# Patient Record
Sex: FEMALE | Race: BLACK OR AFRICAN AMERICAN | NOT HISPANIC OR LATINO | Employment: OTHER | URBAN - METROPOLITAN AREA
[De-identification: names, ages, dates, MRNs, and addresses within clinical notes are randomized per-mention and may not be internally consistent; named-entity substitution may affect disease eponyms.]

---

## 2020-02-21 LAB
INFLUENZA A ANTIGEN, POC: NEGATIVE
INFLUENZA B ANTIGEN, POC: NEGATIVE
RAPID GROUP A STREP (OHS): NEGATIVE

## 2022-01-04 LAB
INFLUENZA A ANTIGEN, POC: NEGATIVE
INFLUENZA B ANTIGEN, POC: NEGATIVE
RAPID GROUP A STREP (OHS): POSITIVE
SARS-COV-2 RNA RESP QL NAA+PROBE: NEGATIVE

## 2022-04-11 ENCOUNTER — HISTORICAL (OUTPATIENT)
Dept: ADMINISTRATIVE | Facility: HOSPITAL | Age: 57
End: 2022-04-11
Payer: OTHER GOVERNMENT

## 2022-04-26 VITALS
OXYGEN SATURATION: 97 % | HEIGHT: 66 IN | BODY MASS INDEX: 27.63 KG/M2 | SYSTOLIC BLOOD PRESSURE: 150 MMHG | WEIGHT: 171.94 LBS | DIASTOLIC BLOOD PRESSURE: 79 MMHG

## 2022-07-22 ENCOUNTER — OFFICE VISIT (OUTPATIENT)
Dept: RHEUMATOLOGY | Facility: CLINIC | Age: 57
End: 2022-07-22
Payer: OTHER GOVERNMENT

## 2022-07-22 VITALS
SYSTOLIC BLOOD PRESSURE: 135 MMHG | BODY MASS INDEX: 27.16 KG/M2 | RESPIRATION RATE: 18 BRPM | HEIGHT: 66 IN | DIASTOLIC BLOOD PRESSURE: 86 MMHG | WEIGHT: 169 LBS | OXYGEN SATURATION: 99 %

## 2022-07-22 DIAGNOSIS — L40.9 PSORIASIS: ICD-10-CM

## 2022-07-22 DIAGNOSIS — F31.9 BIPOLAR AFFECTIVE DISORDER, REMISSION STATUS UNSPECIFIED: ICD-10-CM

## 2022-07-22 DIAGNOSIS — E55.9 VITAMIN D DEFICIENCY: ICD-10-CM

## 2022-07-22 DIAGNOSIS — F90.0 ATTENTION DEFICIT HYPERACTIVITY DISORDER, PREDOMINANTLY INATTENTIVE TYPE: ICD-10-CM

## 2022-07-22 DIAGNOSIS — L40.50 PSORIATIC ARTHRITIS: Primary | ICD-10-CM

## 2022-07-22 DIAGNOSIS — F51.01 PRIMARY INSOMNIA: ICD-10-CM

## 2022-07-22 DIAGNOSIS — F43.12 CHRONIC POST-TRAUMATIC STRESS DISORDER: ICD-10-CM

## 2022-07-22 DIAGNOSIS — M79.7 FIBROMYALGIA: ICD-10-CM

## 2022-07-22 PROBLEM — G47.00 INSOMNIA: Status: ACTIVE | Noted: 2022-07-22

## 2022-07-22 PROBLEM — I10 ESSENTIAL HYPERTENSION: Status: ACTIVE | Noted: 2020-12-09

## 2022-07-22 PROBLEM — I67.1 CEREBRAL ANEURYSM, NONRUPTURED: Status: ACTIVE | Noted: 2020-12-09

## 2022-07-22 PROCEDURE — 99204 PR OFFICE/OUTPT VISIT, NEW, LEVL IV, 45-59 MIN: ICD-10-PCS | Mod: S$PBB,ICN,, | Performed by: INTERNAL MEDICINE

## 2022-07-22 PROCEDURE — 99999 PR PBB SHADOW E&M-EST. PATIENT-LVL IV: CPT | Mod: PBBFAC,,, | Performed by: INTERNAL MEDICINE

## 2022-07-22 PROCEDURE — 99999 PR PBB SHADOW E&M-EST. PATIENT-LVL IV: ICD-10-PCS | Mod: PBBFAC,,, | Performed by: INTERNAL MEDICINE

## 2022-07-22 PROCEDURE — 99214 OFFICE O/P EST MOD 30 MIN: CPT | Mod: PBBFAC | Performed by: INTERNAL MEDICINE

## 2022-07-22 PROCEDURE — 99204 OFFICE O/P NEW MOD 45 MIN: CPT | Mod: S$PBB,ICN,, | Performed by: INTERNAL MEDICINE

## 2022-07-22 RX ORDER — LANOLIN ALCOHOL/MO/W.PET/CERES
1 CREAM (GRAM) TOPICAL DAILY
COMMUNITY
Start: 2022-01-11 | End: 2023-03-14 | Stop reason: SDUPTHER

## 2022-07-22 RX ORDER — FOLIC ACID 1 MG/1
1 TABLET ORAL DAILY
COMMUNITY
Start: 2022-01-11 | End: 2022-11-03 | Stop reason: SDUPTHER

## 2022-07-22 RX ORDER — DEXTROAMPHETAMINE SACCHARATE, AMPHETAMINE ASPARTATE MONOHYDRATE, DEXTROAMPHETAMINE SULFATE AND AMPHETAMINE SULFATE 5; 5; 5; 5 MG/1; MG/1; MG/1; MG/1
20 CAPSULE, EXTENDED RELEASE ORAL DAILY
COMMUNITY

## 2022-07-22 RX ORDER — METHOTREXATE 2.5 MG/1
10 TABLET ORAL
Qty: 20 TABLET | Refills: 5 | Status: SHIPPED | OUTPATIENT
Start: 2022-07-22 | End: 2022-11-03 | Stop reason: SDUPTHER

## 2022-07-22 RX ORDER — TRAZODONE HYDROCHLORIDE 100 MG/1
1 TABLET ORAL NIGHTLY
COMMUNITY
Start: 2022-06-16

## 2022-07-22 RX ORDER — HYDROXYZINE HYDROCHLORIDE 25 MG/1
25 TABLET, FILM COATED ORAL
COMMUNITY
Start: 2022-03-30

## 2022-07-22 RX ORDER — CYCLOBENZAPRINE HCL 10 MG
10 TABLET ORAL
COMMUNITY
Start: 2022-01-11 | End: 2022-07-22

## 2022-07-22 RX ORDER — CHOLECALCIFEROL (VITAMIN D3) 50 MCG
TABLET ORAL DAILY
COMMUNITY
Start: 2022-01-11

## 2022-07-22 RX ORDER — TIZANIDINE 4 MG/1
4 TABLET ORAL NIGHTLY
Qty: 30 TABLET | Refills: 5 | Status: SHIPPED | OUTPATIENT
Start: 2022-07-22 | End: 2022-11-03 | Stop reason: SDUPTHER

## 2022-07-22 RX ORDER — VENLAFAXINE HYDROCHLORIDE 75 MG/1
75 CAPSULE, EXTENDED RELEASE ORAL DAILY
COMMUNITY
Start: 2021-08-16

## 2022-07-22 RX ORDER — ASPIRIN 81 MG/1
81 TABLET ORAL DAILY
COMMUNITY

## 2022-07-22 NOTE — PROGRESS NOTES
"Subjective:       Patient ID: Dona Barton is a 56 y.o. female.    Chief Complaint: New patient  (New patient-generalized pain )    Pt  is complaining of joint pain involving her spine, MCP PIP wrist elbow shoulders hips knees sacroiliacs and ankles bilaterally.  Is 8/10 in intensity dull in quality and continuous.  It is associated with a morning stiffness lasting for more than 60 minutes. Pt reports having difficulty maintaining a good night of sleep and this has been associated with myalgia of 7/10 in intensity.  This pain is dull continuous and gets worse mainly at night.  It is associated with fatigue. psoriasis    Review of Systems   Constitutional: Negative for appetite change, chills and fever.   HENT: Negative for congestion, ear pain, mouth sores, nosebleeds and trouble swallowing.    Eyes: Negative for photophobia and discharge.   Respiratory: Negative for chest tightness and shortness of breath.    Cardiovascular: Negative for chest pain.   Gastrointestinal: Negative for abdominal pain and vomiting.   Endocrine: Negative.    Genitourinary: Negative for hematuria.   Musculoskeletal:        As per HPI   Skin: Negative for rash.        psoriasis   Neurological: Negative for weakness.         Objective:   /86 (BP Location: Left arm, Patient Position: Sitting, BP Method: Medium (Automatic))   Resp 18   Ht 5' 6" (1.676 m)   Wt 76.7 kg (169 lb)   SpO2 99%   BMI 27.28 kg/m²      Physical Exam   Constitutional: She is oriented to person, place, and time. She appears well-developed and well-nourished. No distress.   HENT:   Head: Normocephalic and atraumatic.   Right Ear: External ear normal.   Left Ear: External ear normal.   Eyes: Pupils are equal, round, and reactive to light.   Cardiovascular: Normal rate, regular rhythm and normal heart sounds.   Pulmonary/Chest: Breath sounds normal.   Abdominal: Soft. There is no abdominal tenderness.   Musculoskeletal:      Right shoulder: Tenderness present. "      Left shoulder: Tenderness present.      Right elbow: Tenderness present.      Left elbow: Tenderness present.      Right wrist: Tenderness present.      Left wrist: Tenderness present.      Cervical back: Neck supple.      Right hip: Tenderness present.      Left hip: Tenderness present.      Right knee: Tenderness present.      Left knee: Tenderness present.      Right ankle: Tenderness present.      Left ankle: Tenderness present.   Lymphadenopathy:     She has no cervical adenopathy.   Neurological: She is alert and oriented to person, place, and time. She displays normal reflexes. No cranial nerve deficit or sensory deficit. She exhibits normal muscle tone. Coordination normal.   Skin: No rash noted. No erythema.   psoriasis   Vitals reviewed.      Right Side Rheumatological Exam     The patient is tender to palpation of the shoulder, elbow, wrist, knee, 1st PIP, 1st MCP, 2nd PIP, 2nd MCP, 3rd PIP, 3rd MCP, 4th PIP, 4th MCP, 5th PIP, hip, ankle, 1st MTP, 2nd MTP, 3rd MTP, 4th MTP, 5th MTP, 1st toe IP, 2nd toe IP, 3rd toe IP, 4th toe IP and 5th toe IP    Left Side Rheumatological Exam     The patient is tender to palpation of the shoulder, elbow, wrist, knee, 1st PIP, 1st MCP, 2nd PIP, 2nd MCP, 3rd PIP, 3rd MCP, 4th PIP, 4th MCP, 5th PIP, 5th MCP, hip, ankle, 1st MTP, 2nd MTP, 3rd MTP, 4th MTP, 5th MTP, 1st toe IP, 2nd toe IP, 3rd toe IP, 4th toe IP and 5th toe IP.         Completed Fibromyalgia exam 18/18 tender points.  No data to display     Assessment:       1. Psoriatic arthritis    2. Psoriasis    3. Fibromyalgia    4. Vitamin D deficiency    5. Bipolar affective disorder, remission status unspecified    6. Chronic post-traumatic stress disorder    7. Attention deficit hyperactivity disorder, predominantly inattentive type    8. Primary insomnia            Plan:       Problem List Items Addressed This Visit        Psychiatric    Attention deficit hyperactivity disorder, predominantly inattentive type     Relevant Orders    CBC Auto Differential    Comprehensive Metabolic Panel    CRP, High Sensitivity    Vitamin D    Rheumatoid Quantitative    Cyclic Citrullinated Peptide Antibody, IgG    Antinuclear Ab, HEp-2 Substrate    Hepatitis B Surface Antigen    Hepatitis C Antibody    TSH    T4, Free    X-Ray Sacroiliac Joints Complete    X-Ray Pelvis Routine AP    Chronic post-traumatic stress disorder    Relevant Orders    CBC Auto Differential    Comprehensive Metabolic Panel    CRP, High Sensitivity    Vitamin D    Rheumatoid Quantitative    Cyclic Citrullinated Peptide Antibody, IgG    Antinuclear Ab, HEp-2 Substrate    Hepatitis B Surface Antigen    Hepatitis C Antibody    TSH    T4, Free    X-Ray Sacroiliac Joints Complete    X-Ray Pelvis Routine AP    Bipolar disorder    Relevant Orders    CBC Auto Differential    Comprehensive Metabolic Panel    CRP, High Sensitivity    Vitamin D    Rheumatoid Quantitative    Cyclic Citrullinated Peptide Antibody, IgG    Antinuclear Ab, HEp-2 Substrate    Hepatitis B Surface Antigen    Hepatitis C Antibody    TSH    T4, Free    X-Ray Sacroiliac Joints Complete    X-Ray Pelvis Routine AP       Derm    Psoriasis    Relevant Orders    CBC Auto Differential    Comprehensive Metabolic Panel    CRP, High Sensitivity    Vitamin D    Rheumatoid Quantitative    Cyclic Citrullinated Peptide Antibody, IgG    Antinuclear Ab, HEp-2 Substrate    Hepatitis B Surface Antigen    Hepatitis C Antibody    TSH    T4, Free    X-Ray Sacroiliac Joints Complete    X-Ray Pelvis Routine AP       Endocrine    Vitamin D deficiency    Relevant Orders    CBC Auto Differential    Comprehensive Metabolic Panel    CRP, High Sensitivity    Vitamin D    Rheumatoid Quantitative    Cyclic Citrullinated Peptide Antibody, IgG    Antinuclear Ab, HEp-2 Substrate    Hepatitis B Surface Antigen    Hepatitis C Antibody    TSH    T4, Free    X-Ray Sacroiliac Joints Complete    X-Ray Pelvis Routine AP       Orthopedic     Fibromyalgia    Relevant Orders    CBC Auto Differential    Comprehensive Metabolic Panel    CRP, High Sensitivity    Vitamin D    Rheumatoid Quantitative    Cyclic Citrullinated Peptide Antibody, IgG    Antinuclear Ab, HEp-2 Substrate    Hepatitis B Surface Antigen    Hepatitis C Antibody    TSH    T4, Free    X-Ray Sacroiliac Joints Complete    X-Ray Pelvis Routine AP    Psoriatic arthritis - Primary    Relevant Orders    CBC Auto Differential    Comprehensive Metabolic Panel    CRP, High Sensitivity    Vitamin D    Rheumatoid Quantitative    Cyclic Citrullinated Peptide Antibody, IgG    Antinuclear Ab, HEp-2 Substrate    Hepatitis B Surface Antigen    Hepatitis C Antibody    TSH    T4, Free    X-Ray Sacroiliac Joints Complete    X-Ray Pelvis Routine AP       Other    Insomnia    Relevant Orders    CBC Auto Differential    Comprehensive Metabolic Panel    CRP, High Sensitivity    Vitamin D    Rheumatoid Quantitative    Cyclic Citrullinated Peptide Antibody, IgG    Antinuclear Ab, HEp-2 Substrate    Hepatitis B Surface Antigen    Hepatitis C Antibody    TSH    T4, Free    X-Ray Sacroiliac Joints Complete    X-Ray Pelvis Routine AP

## 2022-08-24 ENCOUNTER — LAB REQUISITION (OUTPATIENT)
Dept: LAB | Facility: HOSPITAL | Age: 57
End: 2022-08-24

## 2022-08-24 DIAGNOSIS — L73.8 OTHER SPECIFIED FOLLICULAR DISORDERS: ICD-10-CM

## 2022-08-24 PROCEDURE — 87070 CULTURE OTHR SPECIMN AEROBIC: CPT | Performed by: PEDIATRICS

## 2022-08-27 LAB — BACTERIA SPEC CULT: NO GROWTH

## 2022-09-22 ENCOUNTER — HISTORICAL (OUTPATIENT)
Dept: ADMINISTRATIVE | Facility: HOSPITAL | Age: 57
End: 2022-09-22
Payer: OTHER GOVERNMENT

## 2022-10-04 ENCOUNTER — TELEPHONE (OUTPATIENT)
Dept: RHEUMATOLOGY | Facility: CLINIC | Age: 57
End: 2022-10-04
Payer: OTHER GOVERNMENT

## 2022-10-04 DIAGNOSIS — L40.50 PSORIATIC ARTHRITIS: Primary | ICD-10-CM

## 2022-10-04 RX ORDER — OMEPRAZOLE 40 MG/1
40 CAPSULE, DELAYED RELEASE ORAL DAILY
Qty: 30 CAPSULE | Refills: 11 | Status: SHIPPED | OUTPATIENT
Start: 2022-10-04 | End: 2022-11-03 | Stop reason: SDUPTHER

## 2022-10-04 RX ORDER — DICLOFENAC SODIUM 50 MG/1
50 TABLET, DELAYED RELEASE ORAL 2 TIMES DAILY PRN
Qty: 60 TABLET | Refills: 5 | Status: SHIPPED | OUTPATIENT
Start: 2022-10-04 | End: 2023-03-09 | Stop reason: SDUPTHER

## 2022-10-04 RX ORDER — GABAPENTIN 100 MG/1
100 CAPSULE ORAL 2 TIMES DAILY
Qty: 60 CAPSULE | Refills: 5 | Status: SHIPPED | OUTPATIENT
Start: 2022-10-04 | End: 2022-11-03 | Stop reason: SDUPTHER

## 2022-10-04 NOTE — TELEPHONE ENCOUNTER
Patient recently diagnosed with Lupus. Notes are in chart. Patient would like to know if you recommend any treatment at this time. She is an established pt and has an appt in November. Please advise.

## 2022-11-03 ENCOUNTER — OFFICE VISIT (OUTPATIENT)
Dept: RHEUMATOLOGY | Facility: CLINIC | Age: 57
End: 2022-11-03
Payer: OTHER GOVERNMENT

## 2022-11-03 VITALS
HEIGHT: 66 IN | SYSTOLIC BLOOD PRESSURE: 108 MMHG | WEIGHT: 166.81 LBS | BODY MASS INDEX: 26.81 KG/M2 | OXYGEN SATURATION: 99 % | DIASTOLIC BLOOD PRESSURE: 70 MMHG | RESPIRATION RATE: 16 BRPM | TEMPERATURE: 98 F | HEART RATE: 69 BPM

## 2022-11-03 DIAGNOSIS — F51.01 PRIMARY INSOMNIA: ICD-10-CM

## 2022-11-03 DIAGNOSIS — L40.50 PSORIATIC ARTHRITIS: Primary | ICD-10-CM

## 2022-11-03 DIAGNOSIS — F31.9 BIPOLAR AFFECTIVE DISORDER, REMISSION STATUS UNSPECIFIED: ICD-10-CM

## 2022-11-03 DIAGNOSIS — E55.9 VITAMIN D DEFICIENCY: ICD-10-CM

## 2022-11-03 DIAGNOSIS — M79.7 FIBROMYALGIA: ICD-10-CM

## 2022-11-03 DIAGNOSIS — L40.9 PSORIASIS: ICD-10-CM

## 2022-11-03 DIAGNOSIS — F43.12 CHRONIC POST-TRAUMATIC STRESS DISORDER: ICD-10-CM

## 2022-11-03 DIAGNOSIS — F90.0 ATTENTION DEFICIT HYPERACTIVITY DISORDER, PREDOMINANTLY INATTENTIVE TYPE: ICD-10-CM

## 2022-11-03 DIAGNOSIS — K90.0 CELIAC DISEASE: ICD-10-CM

## 2022-11-03 PROCEDURE — 99214 PR OFFICE/OUTPT VISIT, EST, LEVL IV, 30-39 MIN: ICD-10-PCS | Mod: S$PBB,,, | Performed by: INTERNAL MEDICINE

## 2022-11-03 PROCEDURE — 99999 PR PBB SHADOW E&M-EST. PATIENT-LVL IV: ICD-10-PCS | Mod: PBBFAC,,, | Performed by: INTERNAL MEDICINE

## 2022-11-03 PROCEDURE — 99214 OFFICE O/P EST MOD 30 MIN: CPT | Mod: PBBFAC | Performed by: INTERNAL MEDICINE

## 2022-11-03 PROCEDURE — 99999 PR PBB SHADOW E&M-EST. PATIENT-LVL IV: CPT | Mod: PBBFAC,,, | Performed by: INTERNAL MEDICINE

## 2022-11-03 PROCEDURE — 99214 OFFICE O/P EST MOD 30 MIN: CPT | Mod: S$PBB,,, | Performed by: INTERNAL MEDICINE

## 2022-11-03 RX ORDER — OMEPRAZOLE 40 MG/1
40 CAPSULE, DELAYED RELEASE ORAL DAILY
Qty: 30 CAPSULE | Refills: 11 | Status: SHIPPED | OUTPATIENT
Start: 2022-11-03 | End: 2023-03-09 | Stop reason: SDUPTHER

## 2022-11-03 RX ORDER — FOLIC ACID 1 MG/1
1000 TABLET ORAL DAILY
Qty: 30 TABLET | Refills: 5 | Status: SHIPPED | OUTPATIENT
Start: 2022-11-03 | End: 2023-03-09 | Stop reason: SDUPTHER

## 2022-11-03 RX ORDER — GABAPENTIN 100 MG/1
100 CAPSULE ORAL 2 TIMES DAILY
Qty: 60 CAPSULE | Refills: 5 | Status: SHIPPED | OUTPATIENT
Start: 2022-11-03 | End: 2023-03-09 | Stop reason: SDUPTHER

## 2022-11-03 RX ORDER — TIZANIDINE 4 MG/1
4 TABLET ORAL NIGHTLY
Qty: 30 TABLET | Refills: 5 | Status: SHIPPED | OUTPATIENT
Start: 2022-11-03 | End: 2023-03-09 | Stop reason: SDUPTHER

## 2022-11-03 RX ORDER — METHOTREXATE 2.5 MG/1
15 TABLET ORAL
Qty: 30 TABLET | Refills: 5 | Status: SHIPPED | OUTPATIENT
Start: 2022-11-03 | End: 2023-03-09 | Stop reason: SDUPTHER

## 2022-11-03 RX ORDER — CLOBETASOL PROPIONATE 0.5 MG/G
CREAM TOPICAL NIGHTLY
Qty: 60 G | Refills: 5 | Status: SHIPPED | OUTPATIENT
Start: 2022-11-03

## 2022-11-03 NOTE — PROGRESS NOTES
"Subjective:       Patient ID: Dona Barton is a 57 y.o. female.    Chief Complaint: Follow-up (Patient is a 3 month f/u Psoriatic Arthritis.)    IGA SEROLOGY FOR CELIAC POSITIVE  The patient is complaining of joint pain involving the MCP PIP wrist elbow shoulders hips knees and ankles bilaterally.  The pain is 4/10 in intensity dull in quality and continuous.  That is associated with a morning stiffness lasting for more than 60 minutes.  Is also having difficulty maintaining a good night of sleep.  This has been associated with myalgias.  Muscle aches are 5/10 in intensity dull in quality and continuous.  They are associated with fatigue.  No fever no chills no others.      Review of Systems   Constitutional:  Negative for appetite change, chills and fever.   HENT:  Negative for congestion, ear pain, mouth sores, nosebleeds and trouble swallowing.    Eyes:  Negative for photophobia and discharge.   Respiratory:  Negative for chest tightness and shortness of breath.    Cardiovascular:  Negative for chest pain.   Gastrointestinal:  Negative for abdominal pain and vomiting.   Endocrine: Negative.    Genitourinary:  Negative for hematuria.   Musculoskeletal:         As per HPI   Skin:  Negative for rash.   Neurological:  Negative for weakness.       Objective:   /70 (BP Location: Right arm, Patient Position: Sitting, BP Method: Medium (Automatic))   Pulse 69   Temp 98.4 °F (36.9 °C) (Oral)   Resp 16   Ht 5' 6" (1.676 m)   Wt 75.7 kg (166 lb 12.8 oz)   SpO2 99%   BMI 26.92 kg/m²      Physical Exam   Constitutional: She is oriented to person, place, and time. She appears well-developed and well-nourished. No distress.   HENT:   Head: Normocephalic and atraumatic.   Right Ear: External ear normal.   Left Ear: External ear normal.   Eyes: Pupils are equal, round, and reactive to light.   Cardiovascular: Normal rate, regular rhythm and normal heart sounds.   Pulmonary/Chest: Breath sounds normal.   Abdominal: " Soft. There is no abdominal tenderness.   Musculoskeletal:      Right shoulder: Tenderness present.      Left shoulder: Tenderness present.      Right elbow: Tenderness present.      Left elbow: Tenderness present.      Right wrist: Tenderness present.      Left wrist: Tenderness present.      Cervical back: Neck supple.      Right hip: Tenderness present.      Left hip: Tenderness present.      Right knee: Tenderness present.      Left knee: Tenderness present.      Right ankle: Tenderness present.      Left ankle: Tenderness present.   Lymphadenopathy:     She has no cervical adenopathy.   Neurological: She is alert and oriented to person, place, and time. She displays normal reflexes. No cranial nerve deficit or sensory deficit. She exhibits normal muscle tone. Coordination normal.   Skin: No rash noted. No erythema.   Vitals reviewed.      Right Side Rheumatological Exam     The patient is tender to palpation of the shoulder, elbow, wrist, knee, 1st PIP, 1st MCP, 2nd PIP, 2nd MCP, 3rd PIP, 3rd MCP, 4th PIP, 4th MCP, 5th PIP, hip, ankle, 1st MTP, 2nd MTP, 3rd MTP, 4th MTP, 5th MTP, 1st toe IP, 2nd toe IP, 3rd toe IP, 4th toe IP and 5th toe IP    Left Side Rheumatological Exam     The patient is tender to palpation of the shoulder, elbow, wrist, knee, 1st PIP, 1st MCP, 2nd PIP, 2nd MCP, 3rd PIP, 3rd MCP, 4th PIP, 4th MCP, 5th PIP, 5th MCP, hip, ankle, 1st MTP, 2nd MTP, 3rd MTP, 4th MTP, 5th MTP, 1st toe IP, 2nd toe IP, 3rd toe IP, 4th toe IP and 5th toe IP.       Completed Fibromyalgia exam 11/18 tender points.  No data to display     Assessment:       1. Psoriatic arthritis    2. Psoriasis    3. Vitamin D deficiency    4. Fibromyalgia    5. Primary insomnia    6. Attention deficit hyperactivity disorder, predominantly inattentive type    7. Bipolar affective disorder, remission status unspecified    8. Chronic post-traumatic stress disorder    9. Celiac disease            Medication List with Changes/Refills    New Medications    CLOBETASOL (TEMOVATE) 0.05 % CREAM    Apply topically every evening.       Start Date: 11/3/2022 End Date: --   Current Medications    ASPIRIN (ECOTRIN) 81 MG EC TABLET    Take 81 mg by mouth once daily.       Start Date: --        End Date: --    CHOLECALCIFEROL, VITAMIN D3, (VITAMIN D3) 50 MCG (2,000 UNIT) TAB    once daily.       Start Date: 1/11/2022 End Date: --    CYANOCOBALAMIN (VITAMIN B-12) 1000 MCG TABLET    Take 1 tablet by mouth once daily.       Start Date: 1/11/2022 End Date: --    DEXTROAMPHETAMINE-AMPHETAMINE (ADDERALL XR) 20 MG 24 HR CAPSULE    Take 20 mg by mouth once daily.       Start Date: --        End Date: --    DICLOFENAC (VOLTAREN) 50 MG EC TABLET    Take 1 tablet (50 mg total) by mouth 2 (two) times daily as needed (pain, after food).       Start Date: 10/4/2022 End Date: --    HYDROXYZINE HCL (ATARAX) 25 MG TABLET    25 mg as needed.       Start Date: 3/30/2022 End Date: --    TRAZODONE (DESYREL) 100 MG TABLET    Take 1 tablet by mouth nightly.       Start Date: 6/16/2022 End Date: --    VENLAFAXINE (EFFEXOR-XR) 75 MG 24 HR CAPSULE    75 mg once daily.       Start Date: 8/16/2021 End Date: --   Changed and/or Refilled Medications    Modified Medication Previous Medication    FOLIC ACID (FOLVITE) 1 MG TABLET folic acid (FOLVITE) 1 MG tablet       Take 1 tablet (1,000 mcg total) by mouth once daily.    Take 1 tablet by mouth once daily.       Start Date: 11/3/2022 End Date: --    Start Date: 1/11/2022 End Date: 11/3/2022    GABAPENTIN (NEURONTIN) 100 MG CAPSULE gabapentin (NEURONTIN) 100 MG capsule       Take 1 capsule (100 mg total) by mouth 2 (two) times daily.    Take 1 capsule (100 mg total) by mouth 2 (two) times daily.       Start Date: 11/3/2022 End Date: 11/3/2023    Start Date: 10/4/2022 End Date: 11/3/2022    METHOTREXATE 2.5 MG TAB methotrexate 2.5 MG Tab       Take 6 tablets (15 mg total) by mouth every 7 days. EVERY  MONDAY      TAKE 3 TAB AFTER LUNCH AND 3  TAB AFTER SUPPER    Take 4 tablets (10 mg total) by mouth every 7 days. EVERY        TAKE 4 TAB TOGETHER AFTER SUPPER       Start Date: 11/3/2022 End Date: 5/2/2023    Start Date: 7/22/2022 End Date: 11/3/2022    OMEPRAZOLE (PRILOSEC) 40 MG CAPSULE omeprazole (PRILOSEC) 40 MG capsule       Take 1 capsule (40 mg total) by mouth once daily. Before lunch    Take 1 capsule (40 mg total) by mouth once daily. Before lunch       Start Date: 11/3/2022 End Date: 11/3/2023    Start Date: 10/4/2022 End Date: 11/3/2022    TIZANIDINE (ZANAFLEX) 4 MG TABLET tiZANidine (ZANAFLEX) 4 MG tablet       Take 1 tablet (4 mg total) by mouth nightly.    Take 1 tablet (4 mg total) by mouth nightly.       Start Date: 11/3/2022 End Date: 5/2/2023    Start Date: 7/22/2022 End Date: 11/3/2022         Plan:       Problem List Items Addressed This Visit          Psychiatric    Attention deficit hyperactivity disorder, predominantly inattentive type    Relevant Medications    folic acid (FOLVITE) 1 MG tablet    gabapentin (NEURONTIN) 100 MG capsule    methotrexate 2.5 MG Tab    omeprazole (PRILOSEC) 40 MG capsule    tiZANidine (ZANAFLEX) 4 MG tablet    clobetasoL (TEMOVATE) 0.05 % cream    Chronic post-traumatic stress disorder    Relevant Medications    folic acid (FOLVITE) 1 MG tablet    gabapentin (NEURONTIN) 100 MG capsule    methotrexate 2.5 MG Tab    omeprazole (PRILOSEC) 40 MG capsule    tiZANidine (ZANAFLEX) 4 MG tablet    clobetasoL (TEMOVATE) 0.05 % cream    Bipolar disorder    Relevant Medications    folic acid (FOLVITE) 1 MG tablet    gabapentin (NEURONTIN) 100 MG capsule    methotrexate 2.5 MG Tab    omeprazole (PRILOSEC) 40 MG capsule    tiZANidine (ZANAFLEX) 4 MG tablet    clobetasoL (TEMOVATE) 0.05 % cream       Derm    Psoriasis    Relevant Medications    folic acid (FOLVITE) 1 MG tablet    gabapentin (NEURONTIN) 100 MG capsule    methotrexate 2.5 MG Tab    omeprazole (PRILOSEC) 40 MG capsule    tiZANidine (ZANAFLEX) 4 MG tablet     clobetasoL (TEMOVATE) 0.05 % cream       Endocrine    Vitamin D deficiency    Relevant Medications    folic acid (FOLVITE) 1 MG tablet    gabapentin (NEURONTIN) 100 MG capsule    methotrexate 2.5 MG Tab    omeprazole (PRILOSEC) 40 MG capsule    tiZANidine (ZANAFLEX) 4 MG tablet    clobetasoL (TEMOVATE) 0.05 % cream       Orthopedic    Fibromyalgia    Relevant Medications    folic acid (FOLVITE) 1 MG tablet    gabapentin (NEURONTIN) 100 MG capsule    methotrexate 2.5 MG Tab    omeprazole (PRILOSEC) 40 MG capsule    tiZANidine (ZANAFLEX) 4 MG tablet    clobetasoL (TEMOVATE) 0.05 % cream    Psoriatic arthritis - Primary    Relevant Medications    folic acid (FOLVITE) 1 MG tablet    gabapentin (NEURONTIN) 100 MG capsule    methotrexate 2.5 MG Tab    omeprazole (PRILOSEC) 40 MG capsule    tiZANidine (ZANAFLEX) 4 MG tablet    clobetasoL (TEMOVATE) 0.05 % cream       Other    Insomnia    Relevant Medications    folic acid (FOLVITE) 1 MG tablet    gabapentin (NEURONTIN) 100 MG capsule    methotrexate 2.5 MG Tab    omeprazole (PRILOSEC) 40 MG capsule    tiZANidine (ZANAFLEX) 4 MG tablet    clobetasoL (TEMOVATE) 0.05 % cream     Other Visit Diagnoses       Celiac disease        Relevant Medications    folic acid (FOLVITE) 1 MG tablet    gabapentin (NEURONTIN) 100 MG capsule    methotrexate 2.5 MG Tab    omeprazole (PRILOSEC) 40 MG capsule    tiZANidine (ZANAFLEX) 4 MG tablet    clobetasoL (TEMOVATE) 0.05 % cream

## 2023-03-09 ENCOUNTER — OFFICE VISIT (OUTPATIENT)
Dept: RHEUMATOLOGY | Facility: CLINIC | Age: 58
End: 2023-03-09
Payer: OTHER GOVERNMENT

## 2023-03-09 VITALS
HEART RATE: 67 BPM | HEIGHT: 66 IN | OXYGEN SATURATION: 98 % | WEIGHT: 172.19 LBS | BODY MASS INDEX: 27.67 KG/M2 | TEMPERATURE: 98 F | DIASTOLIC BLOOD PRESSURE: 73 MMHG | SYSTOLIC BLOOD PRESSURE: 125 MMHG

## 2023-03-09 DIAGNOSIS — K90.0 CELIAC DISEASE: ICD-10-CM

## 2023-03-09 DIAGNOSIS — F43.12 CHRONIC POST-TRAUMATIC STRESS DISORDER: ICD-10-CM

## 2023-03-09 DIAGNOSIS — F51.01 PRIMARY INSOMNIA: ICD-10-CM

## 2023-03-09 DIAGNOSIS — M79.7 FIBROMYALGIA: ICD-10-CM

## 2023-03-09 DIAGNOSIS — E55.9 VITAMIN D DEFICIENCY: ICD-10-CM

## 2023-03-09 DIAGNOSIS — L40.50 PSORIATIC ARTHRITIS: ICD-10-CM

## 2023-03-09 DIAGNOSIS — I67.1 CEREBRAL ANEURYSM, NONRUPTURED: Primary | ICD-10-CM

## 2023-03-09 DIAGNOSIS — L40.9 PSORIASIS: ICD-10-CM

## 2023-03-09 DIAGNOSIS — F31.9 BIPOLAR AFFECTIVE DISORDER, REMISSION STATUS UNSPECIFIED: ICD-10-CM

## 2023-03-09 DIAGNOSIS — F90.0 ATTENTION DEFICIT HYPERACTIVITY DISORDER, PREDOMINANTLY INATTENTIVE TYPE: ICD-10-CM

## 2023-03-09 PROCEDURE — 99999 PR PBB SHADOW E&M-EST. PATIENT-LVL IV: CPT | Mod: PBBFAC,,, | Performed by: INTERNAL MEDICINE

## 2023-03-09 PROCEDURE — 99214 PR OFFICE/OUTPT VISIT, EST, LEVL IV, 30-39 MIN: ICD-10-PCS | Mod: S$PBB,,, | Performed by: INTERNAL MEDICINE

## 2023-03-09 PROCEDURE — 99999 PR PBB SHADOW E&M-EST. PATIENT-LVL IV: ICD-10-PCS | Mod: PBBFAC,,, | Performed by: INTERNAL MEDICINE

## 2023-03-09 PROCEDURE — 99214 OFFICE O/P EST MOD 30 MIN: CPT | Mod: S$PBB,,, | Performed by: INTERNAL MEDICINE

## 2023-03-09 PROCEDURE — 99214 OFFICE O/P EST MOD 30 MIN: CPT | Mod: PBBFAC | Performed by: INTERNAL MEDICINE

## 2023-03-09 RX ORDER — FOLIC ACID 1 MG/1
1000 TABLET ORAL DAILY
Qty: 90 TABLET | Refills: 3 | Status: SHIPPED | OUTPATIENT
Start: 2023-03-09 | End: 2023-07-13 | Stop reason: SDUPTHER

## 2023-03-09 RX ORDER — ALENDRONATE SODIUM 70 MG/1
70 TABLET ORAL
COMMUNITY
Start: 2022-08-25

## 2023-03-09 RX ORDER — TRAMADOL HYDROCHLORIDE 50 MG/1
50 TABLET ORAL 3 TIMES DAILY PRN
Qty: 90 TABLET | Refills: 5 | Status: SHIPPED | OUTPATIENT
Start: 2023-03-09 | End: 2023-07-13 | Stop reason: SDUPTHER

## 2023-03-09 RX ORDER — METHOTREXATE 2.5 MG/1
15 TABLET ORAL
Qty: 90 TABLET | Refills: 3 | Status: SHIPPED | OUTPATIENT
Start: 2023-03-09 | End: 2023-07-13 | Stop reason: SDUPTHER

## 2023-03-09 RX ORDER — OMEPRAZOLE 40 MG/1
40 CAPSULE, DELAYED RELEASE ORAL DAILY
Qty: 90 CAPSULE | Refills: 3 | Status: SHIPPED | OUTPATIENT
Start: 2023-03-09 | End: 2023-07-13 | Stop reason: SDUPTHER

## 2023-03-09 RX ORDER — TIZANIDINE 4 MG/1
4 TABLET ORAL NIGHTLY
Qty: 90 TABLET | Refills: 3 | Status: SHIPPED | OUTPATIENT
Start: 2023-03-09 | End: 2023-07-13 | Stop reason: SDUPTHER

## 2023-03-09 RX ORDER — FERROUS SULFATE 325(65) MG
325 TABLET ORAL DAILY
COMMUNITY
Start: 2023-02-23 | End: 2023-03-25

## 2023-03-09 RX ORDER — GABAPENTIN 100 MG/1
100 CAPSULE ORAL 3 TIMES DAILY
Qty: 270 CAPSULE | Refills: 3 | Status: SHIPPED | OUTPATIENT
Start: 2023-03-09 | End: 2023-07-13 | Stop reason: SDUPTHER

## 2023-03-09 RX ORDER — DICLOFENAC SODIUM 50 MG/1
50 TABLET, DELAYED RELEASE ORAL 2 TIMES DAILY PRN
Qty: 180 TABLET | Refills: 3 | Status: SHIPPED | OUTPATIENT
Start: 2023-03-09 | End: 2023-07-13

## 2023-03-14 DIAGNOSIS — M79.7 FIBROMYALGIA: ICD-10-CM

## 2023-03-14 DIAGNOSIS — L40.9 PSORIASIS: ICD-10-CM

## 2023-03-14 DIAGNOSIS — L40.50 PSORIATIC ARTHRITIS: Primary | ICD-10-CM

## 2023-03-14 RX ORDER — LANOLIN ALCOHOL/MO/W.PET/CERES
1000 CREAM (GRAM) TOPICAL DAILY
Qty: 75 TABLET | Refills: 11 | Status: SHIPPED | OUTPATIENT
Start: 2023-03-14

## 2023-03-14 NOTE — TELEPHONE ENCOUNTER
Received a request from [facility] stating Folbic is not covered and what other meds has patient tried.  I called [facility] @ [phone], option 1, spoke w/ Fady Medley and Moses, Pharmacist.  Per Dr. Hernandez, patient has not tried anything else.  She will need to take separately folic acid 1 mg daily and B12 2,500 mcg Sublingual.  Per Moses, they only have B12 1,000 mcg, not sublingual.  Dr. Hernandez was not available, MOISES Bhatti said if the pills are paid for then have them take 2.5 tabs.  New Rx will be sent to [facility].  They have folic acid Rx already.

## 2023-04-03 ENCOUNTER — TELEPHONE (OUTPATIENT)
Dept: RHEUMATOLOGY | Facility: CLINIC | Age: 58
End: 2023-04-03
Payer: OTHER GOVERNMENT

## 2023-04-03 DIAGNOSIS — M54.9 BACK PAIN, UNSPECIFIED BACK LOCATION, UNSPECIFIED BACK PAIN LATERALITY, UNSPECIFIED CHRONICITY: Primary | ICD-10-CM

## 2023-04-03 RX ORDER — BACLOFEN 10 MG/1
10 TABLET ORAL 2 TIMES DAILY PRN
Qty: 60 TABLET | Refills: 5 | Status: SHIPPED | OUTPATIENT
Start: 2023-04-03 | End: 2024-04-02

## 2023-07-13 ENCOUNTER — OFFICE VISIT (OUTPATIENT)
Dept: RHEUMATOLOGY | Facility: CLINIC | Age: 58
End: 2023-07-13
Payer: OTHER GOVERNMENT

## 2023-07-13 VITALS
WEIGHT: 171.38 LBS | RESPIRATION RATE: 18 BRPM | BODY MASS INDEX: 27.54 KG/M2 | DIASTOLIC BLOOD PRESSURE: 73 MMHG | SYSTOLIC BLOOD PRESSURE: 113 MMHG | OXYGEN SATURATION: 97 % | HEART RATE: 66 BPM | HEIGHT: 66 IN | TEMPERATURE: 98 F

## 2023-07-13 DIAGNOSIS — L40.9 PSORIASIS: ICD-10-CM

## 2023-07-13 DIAGNOSIS — I10 ESSENTIAL HYPERTENSION: ICD-10-CM

## 2023-07-13 DIAGNOSIS — E55.9 VITAMIN D DEFICIENCY: ICD-10-CM

## 2023-07-13 DIAGNOSIS — I67.1 CEREBRAL ANEURYSM, NONRUPTURED: ICD-10-CM

## 2023-07-13 DIAGNOSIS — M79.7 FIBROMYALGIA: ICD-10-CM

## 2023-07-13 DIAGNOSIS — F43.12 CHRONIC POST-TRAUMATIC STRESS DISORDER: ICD-10-CM

## 2023-07-13 DIAGNOSIS — L40.50 PSORIATIC ARTHRITIS: Primary | ICD-10-CM

## 2023-07-13 DIAGNOSIS — F90.0 ATTENTION DEFICIT HYPERACTIVITY DISORDER, PREDOMINANTLY INATTENTIVE TYPE: ICD-10-CM

## 2023-07-13 DIAGNOSIS — F51.01 PRIMARY INSOMNIA: ICD-10-CM

## 2023-07-13 PROCEDURE — 99214 OFFICE O/P EST MOD 30 MIN: CPT | Mod: S$PBB,,, | Performed by: INTERNAL MEDICINE

## 2023-07-13 PROCEDURE — 99214 PR OFFICE/OUTPT VISIT, EST, LEVL IV, 30-39 MIN: ICD-10-PCS | Mod: S$PBB,,, | Performed by: INTERNAL MEDICINE

## 2023-07-13 PROCEDURE — 99214 OFFICE O/P EST MOD 30 MIN: CPT | Mod: PBBFAC | Performed by: INTERNAL MEDICINE

## 2023-07-13 PROCEDURE — 99999 PR PBB SHADOW E&M-EST. PATIENT-LVL IV: CPT | Mod: PBBFAC,,, | Performed by: INTERNAL MEDICINE

## 2023-07-13 PROCEDURE — 99999 PR PBB SHADOW E&M-EST. PATIENT-LVL IV: ICD-10-PCS | Mod: PBBFAC,,, | Performed by: INTERNAL MEDICINE

## 2023-07-13 RX ORDER — HYDROXYCHLOROQUINE SULFATE 200 MG/1
200 TABLET, FILM COATED ORAL 2 TIMES DAILY
Qty: 180 TABLET | Refills: 3 | Status: SHIPPED | OUTPATIENT
Start: 2023-07-13 | End: 2024-07-07

## 2023-07-13 RX ORDER — OMEPRAZOLE 40 MG/1
40 CAPSULE, DELAYED RELEASE ORAL DAILY
Qty: 90 CAPSULE | Refills: 3 | Status: SHIPPED | OUTPATIENT
Start: 2023-07-13 | End: 2024-07-12

## 2023-07-13 RX ORDER — TRAMADOL HYDROCHLORIDE 50 MG/1
50 TABLET ORAL 3 TIMES DAILY PRN
Qty: 270 TABLET | Refills: 3 | Status: SHIPPED | OUTPATIENT
Start: 2023-07-13

## 2023-07-13 RX ORDER — METHOTREXATE 2.5 MG/1
15 TABLET ORAL
Qty: 90 TABLET | Refills: 3 | Status: SHIPPED | OUTPATIENT
Start: 2023-07-13 | End: 2024-09-05

## 2023-07-13 RX ORDER — TIZANIDINE 4 MG/1
4 TABLET ORAL NIGHTLY
Qty: 90 TABLET | Refills: 3 | Status: SHIPPED | OUTPATIENT
Start: 2023-07-13 | End: 2024-07-07

## 2023-07-13 RX ORDER — METOPROLOL SUCCINATE 25 MG/1
25 TABLET, EXTENDED RELEASE ORAL DAILY PRN
COMMUNITY

## 2023-07-13 RX ORDER — FERROUS SULFATE 325(65) MG
TABLET, DELAYED RELEASE (ENTERIC COATED) ORAL 2 TIMES DAILY
COMMUNITY
Start: 2023-06-30

## 2023-07-13 RX ORDER — FOLIC ACID 1 MG/1
1000 TABLET ORAL DAILY
Qty: 90 TABLET | Refills: 3 | Status: SHIPPED | OUTPATIENT
Start: 2023-07-13

## 2023-07-13 RX ORDER — GABAPENTIN 300 MG/1
300 CAPSULE ORAL 2 TIMES DAILY
Qty: 180 CAPSULE | Refills: 3 | Status: SHIPPED | OUTPATIENT
Start: 2023-07-13 | End: 2024-07-12
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
## 2024-04-01 ENCOUNTER — TELEPHONE (OUTPATIENT)
Dept: RHEUMATOLOGY | Facility: CLINIC | Age: 59
End: 2024-04-01
Payer: OTHER GOVERNMENT